# Patient Record
Sex: FEMALE | Race: WHITE | NOT HISPANIC OR LATINO
[De-identification: names, ages, dates, MRNs, and addresses within clinical notes are randomized per-mention and may not be internally consistent; named-entity substitution may affect disease eponyms.]

---

## 2021-07-30 ENCOUNTER — APPOINTMENT (OUTPATIENT)
Dept: DISASTER EMERGENCY | Facility: CLINIC | Age: 66
End: 2021-07-30

## 2021-07-30 DIAGNOSIS — Z01.818 ENCOUNTER FOR OTHER PREPROCEDURAL EXAMINATION: ICD-10-CM

## 2021-07-30 PROBLEM — Z00.00 ENCOUNTER FOR PREVENTIVE HEALTH EXAMINATION: Status: ACTIVE | Noted: 2021-07-30

## 2021-09-10 ENCOUNTER — EMERGENCY (EMERGENCY)
Facility: HOSPITAL | Age: 66
LOS: 1 days | End: 2021-09-10
Admitting: EMERGENCY MEDICINE
Payer: OTHER MISCELLANEOUS

## 2021-09-10 PROCEDURE — 93970 EXTREMITY STUDY: CPT | Mod: 26

## 2021-09-10 PROCEDURE — 99285 EMERGENCY DEPT VISIT HI MDM: CPT

## 2022-08-03 ENCOUNTER — APPOINTMENT (OUTPATIENT)
Dept: ORTHOPEDIC SURGERY | Facility: CLINIC | Age: 67
End: 2022-08-03

## 2022-08-03 VITALS — WEIGHT: 143 LBS | HEIGHT: 66 IN | BODY MASS INDEX: 22.98 KG/M2

## 2022-08-03 PROCEDURE — 72110 X-RAY EXAM L-2 SPINE 4/>VWS: CPT

## 2022-08-03 PROCEDURE — 99214 OFFICE O/P EST MOD 30 MIN: CPT

## 2022-08-03 PROCEDURE — 99072 ADDL SUPL MATRL&STAF TM PHE: CPT

## 2022-08-03 PROCEDURE — 72170 X-RAY EXAM OF PELVIS: CPT

## 2022-08-03 RX ORDER — GABAPENTIN 600 MG/1
600 TABLET, COATED ORAL
Qty: 90 | Refills: 0 | Status: ACTIVE | COMMUNITY
Start: 2022-06-10

## 2022-08-03 RX ORDER — FLUTICASONE PROPIONATE 50 UG/1
50 SPRAY, METERED NASAL
Qty: 48 | Refills: 0 | Status: ACTIVE | COMMUNITY
Start: 2022-02-13

## 2022-08-03 RX ORDER — DIAZEPAM 10 MG/1
10 TABLET ORAL
Qty: 14 | Refills: 0 | Status: ACTIVE | COMMUNITY
Start: 2022-07-07

## 2022-08-03 RX ORDER — DULOXETINE HYDROCHLORIDE 60 MG/1
60 CAPSULE, DELAYED RELEASE PELLETS ORAL
Qty: 30 | Refills: 0 | Status: ACTIVE | COMMUNITY
Start: 2022-07-07

## 2022-08-03 RX ORDER — LANSOPRAZOLE 30 MG/1
30 CAPSULE, DELAYED RELEASE ORAL
Qty: 30 | Refills: 0 | Status: ACTIVE | COMMUNITY
Start: 2022-07-28

## 2022-08-03 RX ORDER — CYCLOBENZAPRINE HYDROCHLORIDE 10 MG/1
10 TABLET, FILM COATED ORAL
Qty: 90 | Refills: 0 | Status: ACTIVE | COMMUNITY
Start: 2022-06-27

## 2022-08-12 NOTE — ASSESSMENT
[FreeTextEntry1] : 66 y/o female 1 year S/p T10-S1 PSF. I advised the patient to follow up with her pain management doctor to discuss repeat Injections. I discussed with the patient that if she is seeing relief with SIJ steroid injections, then she may be a potential surgical candidate for sacroiliac fusion. I am requesting a bilateral lower extremity EMG/NCV to evaluate for chronic radiculopathy vs peroneal neuropathy. I would like to follow up with the patient in 6-8 weeks to assess the results of her EMG. \par \par Prior to appointment and during encounter with patient extensive medical records were reviewed including but not limited to, hospital records, out patient records, imaging results, and lab data. During this appointment the patient was examined, diagnoses were discussed and explained in a face to face manner. In addition extensive time was spent reviewing aforementioned diagnostic studies. Counseling including abnormal image results, differential diagnoses, treatment options, risk and benefits, lifestyle changes, current condition, and current medications was performed. Patient's comments, questions, and concerns were address and patient verbalized understanding. Based on this patient's presentation at our office, which is an orthopedic spine surgeon's office, this patient inherently / intrinsically has a risk, however minute, of developing issues such as Cauda equina syndrome, bowel and bladder changes, or progression of motor or neurological deficits such as paralysis which may be permanent.\par \par I, Ernesto Linder, attest that this documentation has been prepared under the direction and in the presence of provider Paul Ritchie MD.

## 2022-08-12 NOTE — PHYSICAL EXAM
[NL (90)] : forward flexion 90 degrees [NL (30)] : right lateral rotation 30 degrees [NL (45)] : extension 45 degrees [NL (40)] : right lateral bending 40 degrees [5___] : right extensor hallicus longus 5[unfilled]/5 [AP] : anteroposterior [Lateral] : lateral [Outside films reviewed] : Outside films reviewed [de-identified] : Constitutional:\par -  General Appearance: \par Unremarkable\par Body Habitus\par Well Developed \par Well Nourished\par Body Habitus\par No Deformities\par Well Groomed\par \par Ability To communicate:\par Normal\par \par Neurologic: \par Global sensation is intact to upper and lower extremities.  See examination of Neck and/or Spine for exceptions.\par Orientation to Time, Place and Person is: Normal\par Mood And Affect is Normal\par \par Skin:\par -  Head/Face, Right Upper/Lower Extremity, Left Upper/Lower Extremity: Normal\par See Examination of Neck and/or Spine for exceptions\par \par Cardiovascular:\par Peripheral Cardiovascular System is Normal\par \par Palpation of Lymph Nodes:\par Normal Palpation of lymph nodes in: Axilla, Cervical, Inguinal\par Abnormal Palpation of lymph nodes in: None  [] : patient ambulates with assistive device [de-identified] : Chronic L4 and L5 paresthesias on the left  [FreeTextEntry1] : Lumbar Spine X-Rays Taken (08/03/22) - IN-HOUSE [facility]\par On my interpretation of the images\par 1) T10-Sacrum PSF all hardware well placed\par 2) good signs of healing over the TP\par 3) No signs of loosening or failure  [FreeTextEntry9] : Mild to moderate Hip OA [FreeTextEntry8] : +tinels at the fibula head
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
## 2022-08-17 ENCOUNTER — APPOINTMENT (OUTPATIENT)
Dept: ORTHOPEDIC SURGERY | Facility: CLINIC | Age: 67
End: 2022-08-17

## 2022-08-29 ENCOUNTER — APPOINTMENT (OUTPATIENT)
Dept: NEUROLOGY | Facility: CLINIC | Age: 67
End: 2022-08-29

## 2022-10-05 ENCOUNTER — APPOINTMENT (OUTPATIENT)
Dept: ORTHOPEDIC SURGERY | Facility: CLINIC | Age: 67
End: 2022-10-05

## 2022-10-19 ENCOUNTER — APPOINTMENT (OUTPATIENT)
Dept: ORTHOPEDIC SURGERY | Facility: CLINIC | Age: 67
End: 2022-10-19

## 2023-03-29 ENCOUNTER — APPOINTMENT (OUTPATIENT)
Dept: ORTHOPEDIC SURGERY | Facility: CLINIC | Age: 68
End: 2023-03-29
Payer: OTHER MISCELLANEOUS

## 2023-03-29 PROCEDURE — 72070 X-RAY EXAM THORAC SPINE 2VWS: CPT

## 2023-03-29 PROCEDURE — 99214 OFFICE O/P EST MOD 30 MIN: CPT

## 2023-03-29 PROCEDURE — 72100 X-RAY EXAM L-S SPINE 2/3 VWS: CPT

## 2023-03-30 NOTE — PHYSICAL EXAM
[NL (90)] : forward flexion 90 degrees [NL (30)] : right lateral rotation 30 degrees [NL (45)] : extension 45 degrees [NL (40)] : right lateral bending 40 degrees [5___] : right extensor hallicus longus 5[unfilled]/5 [AP] : anteroposterior [Lateral] : lateral [Outside films reviewed] : Outside films reviewed [FreeTextEntry8] : +tinels at the fibula head  [Bilateral] : hip bilaterally [de-identified] : Constitutional:\par -  General Appearance: \par Unremarkable\par Body Habitus\par Well Developed \par Well Nourished\par Body Habitus\par No Deformities\par Well Groomed\par \par Ability To communicate:\par Normal\par \par Neurologic: \par Global sensation is intact to upper and lower extremities.  See examination of Neck and/or Spine for exceptions.\par Orientation to Time, Place and Person is: Normal\par Mood And Affect is Normal\par \par Skin:\par -  Head/Face, Right Upper/Lower Extremity, Left Upper/Lower Extremity: Normal\par See Examination of Neck and/or Spine for exceptions\par \par Cardiovascular:\par Peripheral Cardiovascular System is Normal\par \par Palpation of Lymph Nodes:\par Normal Palpation of lymph nodes in: Axilla, Cervical, Inguinal\par Abnormal Palpation of lymph nodes in: None  [] : patient ambulates with assistive device [de-identified] : stands with a pitched forward posture [de-identified] : pain in the hips b/l with ROM  [FreeTextEntry1] : Lumbar Spine X-Rays Taken (03/29/2023) - [facility]HOUSE [facility]\par On my interpretation of the images\par 1) T10-Sacrum PSF all hardware well placed\par 2) good signs of healing over the TP\par 3) No signs of loosening or failure  [FreeTextEntry9] : Mild to moderate Hip OA
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
## 2023-04-10 ENCOUNTER — APPOINTMENT (OUTPATIENT)
Dept: NEUROLOGY | Facility: CLINIC | Age: 68
End: 2023-04-10
Payer: OTHER MISCELLANEOUS

## 2023-04-10 PROCEDURE — 95886 MUSC TEST DONE W/N TEST COMP: CPT | Mod: 50

## 2023-04-10 PROCEDURE — 95912 NRV CNDJ TEST 11-12 STUDIES: CPT

## 2023-05-31 ENCOUNTER — APPOINTMENT (OUTPATIENT)
Dept: ORTHOPEDIC SURGERY | Facility: CLINIC | Age: 68
End: 2023-05-31
Payer: OTHER MISCELLANEOUS

## 2023-05-31 VITALS — HEIGHT: 66 IN | WEIGHT: 143 LBS | BODY MASS INDEX: 22.98 KG/M2

## 2023-05-31 PROCEDURE — 99214 OFFICE O/P EST MOD 30 MIN: CPT

## 2023-05-31 RX ORDER — HYDROCODONE BITARTRATE AND ACETAMINOPHEN 7.5; 3 MG/1; MG/1
7.5-3 TABLET ORAL
Qty: 120 | Refills: 0 | Status: DISCONTINUED | COMMUNITY
Start: 2022-07-07 | End: 2023-05-31

## 2023-05-31 RX ORDER — HYDROCODONE BITARTRATE AND ACETAMINOPHEN 10; 325 MG/1; MG/1
10-325 TABLET ORAL
Qty: 96 | Refills: 0 | Status: DISCONTINUED | COMMUNITY
Start: 2022-02-14 | End: 2023-05-31
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
## 2023-05-31 NOTE — PHYSICAL EXAM
[NL (90)] : forward flexion 90 degrees [NL (30)] : right lateral rotation 30 degrees [NL (45)] : extension 45 degrees [NL (40)] : right lateral bending 40 degrees [5___] : right extensor hallicus longus 5[unfilled]/5 [Bilateral] : hip bilaterally [AP] : anteroposterior [Lateral] : lateral [Outside films reviewed] : Outside films reviewed [de-identified] : Constitutional:\par -  General Appearance: \par Unremarkable\par Body Habitus\par Well Developed \par Well Nourished\par Body Habitus\par No Deformities\par Well Groomed\par \par Ability To communicate:\par Normal\par \par Neurologic: \par Global sensation is intact to upper and lower extremities.  See examination of Neck and/or Spine for exceptions.\par Orientation to Time, Place and Person is: Normal\par Mood And Affect is Normal\par \par Skin:\par -  Head/Face, Right Upper/Lower Extremity, Left Upper/Lower Extremity: Normal\par See Examination of Neck and/or Spine for exceptions\par \par Cardiovascular:\par Peripheral Cardiovascular System is Normal\par \par Palpation of Lymph Nodes:\par Normal Palpation of lymph nodes in: Axilla, Cervical, Inguinal\par Abnormal Palpation of lymph nodes in: None  [] : patient ambulates with assistive device [de-identified] : stands with a pitched forward posture [de-identified] : pain in the hips b/l with ROM  [de-identified] : L4-5 paraesthesias in L [FreeTextEntry1] : Lumbar Spine X-Rays Taken (03/29/2023) - [facility]HOUSE [facility]\par On my interpretation of the images\par 1) T10-Sacrum PSF all hardware well placed\par 2) good signs of healing over the TP\par 3) No signs of loosening or failure  [FreeTextEntry9] : Mild to moderate Hip OA

## 2023-05-31 NOTE — DATA REVIEWED
[FreeTextEntry1] : On my interpretation of these images from [facility] on 04/10/2023: Lumbar EMG\par L3-S1 chronic not acute radiculopathy L>R.

## 2023-07-10 ENCOUNTER — APPOINTMENT (OUTPATIENT)
Dept: PAIN MANAGEMENT | Facility: CLINIC | Age: 68
End: 2023-07-10

## 2023-08-02 ENCOUNTER — APPOINTMENT (OUTPATIENT)
Dept: ORTHOPEDIC SURGERY | Facility: CLINIC | Age: 68
End: 2023-08-02
Payer: OTHER MISCELLANEOUS

## 2023-08-02 VITALS — BODY MASS INDEX: 23.3 KG/M2 | WEIGHT: 145 LBS | HEIGHT: 66 IN

## 2023-08-02 PROCEDURE — 99214 OFFICE O/P EST MOD 30 MIN: CPT

## 2023-08-02 NOTE — PHYSICAL EXAM
[NL (90)] : forward flexion 90 degrees [NL (30)] : right lateral rotation 30 degrees [NL (45)] : extension 45 degrees [NL (40)] : right lateral bending 40 degrees [5___] : right extensor hallicus longus 5[unfilled]/5 [Bilateral] : hip bilaterally [AP] : anteroposterior [Lateral] : lateral [Outside films reviewed] : Outside films reviewed [de-identified] : Constitutional:\par  -  General Appearance: \par  Unremarkable\par  Body Habitus\par  Well Developed \par  Well Nourished\par  Body Habitus\par  No Deformities\par  Well Groomed\par  \par  Ability To communicate:\par  Normal\par  \par  Neurologic: \par  Global sensation is intact to upper and lower extremities.  See examination of Neck and/or Spine for exceptions.\par  Orientation to Time, Place and Person is: Normal\par  Mood And Affect is Normal\par  \par  Skin:\par  -  Head/Face, Right Upper/Lower Extremity, Left Upper/Lower Extremity: Normal\par  See Examination of Neck and/or Spine for exceptions\par  \par  Cardiovascular:\par  Peripheral Cardiovascular System is Normal\par  \par  Palpation of Lymph Nodes:\par  Normal Palpation of lymph nodes in: Axilla, Cervical, Inguinal\par  Abnormal Palpation of lymph nodes in: None  [] : non-antalgic [de-identified] : stands with a pitched forward posture [FreeTextEntry8] : L>R sciatic nerve.  [de-identified] : pain in the hips b/l with ROM  [FreeTextEntry1] : Lumbar Spine X-Rays Taken (03/29/2023) - [facility]HOU[facility]\par  On my interpretation of the images\par  1) T10-Sacrum PSF all hardware well placed\par  2) good signs of healing over the TP\par  3) No signs of loosening or failure  [FreeTextEntry9] : Mild to moderate Hip OA

## 2023-08-02 NOTE — ASSESSMENT
[FreeTextEntry1] : 67 y/o female with L3-S1 chronic radiculopathy L>R and prior T10-S1 PSF DOS:08/02/2021. She has significant persistent back pain as well as L>R radicular pain.  Continued sacroiliitis.  Advised patient to follow up with pain management for left SIJ injection and monitor relief. Patient is not interested in any surgical intervention in the foreseeable future. I will see her back in 2 months, after the suggested sacroiliac injection.  At that time it would be reasonable to discuss MMI.   Prior to appointment and during encounter with patient extensive medical records were reviewed including but not limited to, hospital records, out patient records, imaging results, and lab data. During this appointment the patient was examined, diagnoses were discussed and explained in a face to face manner. In addition extensive time was spent reviewing aforementioned diagnostic studies. Counseling including abnormal image results, differential diagnoses, treatment options, risk and benefits, lifestyle changes, current condition, and current medications was performed. Patient's comments, questions, and concerns were address and patient verbalized understanding. Based on this patient's presentation at our office, which is an orthopedic spine surgeon's office, this patient inherently / intrinsically has a risk, however minute, of developing issues such as Cauda equina syndrome, bowel and bladder changes, or progression of motor or neurological deficits such as paralysis which may be permanent.  I, Suri Dior, attest that this documentation has been prepared under the direction and in the presence of provider Paul Ritchie MD.
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
## 2023-08-25 ENCOUNTER — APPOINTMENT (OUTPATIENT)
Dept: ORTHOPEDIC SURGERY | Facility: CLINIC | Age: 68
End: 2023-08-25
Payer: OTHER MISCELLANEOUS

## 2023-08-25 VITALS — HEIGHT: 66 IN | BODY MASS INDEX: 23.3 KG/M2 | WEIGHT: 145 LBS

## 2023-08-25 DIAGNOSIS — Z78.9 OTHER SPECIFIED HEALTH STATUS: ICD-10-CM

## 2023-08-25 PROCEDURE — 99455 WORK RELATED DISABILITY EXAM: CPT

## 2023-08-25 NOTE — PHYSICAL EXAM
[NL (90)] : forward flexion 90 degrees [NL (30)] : right lateral rotation 30 degrees [NL (45)] : extension 45 degrees [NL (40)] : right lateral bending 40 degrees [5___] : right extensor hallicus longus 5[unfilled]/5 [AP] : anteroposterior [Lateral] : lateral [Outside films reviewed] : Outside films reviewed [4___] : left extensor hallicus longus 4[unfilled]/5 [de-identified] : Constitutional:\par  -  General Appearance: \par  Unremarkable\par  Body Habitus\par  Well Developed \par  Well Nourished\par  Body Habitus\par  No Deformities\par  Well Groomed\par  \par  Ability To communicate:\par  Normal\par  \par  Neurologic: \par  Global sensation is intact to upper and lower extremities.  See examination of Neck and/or Spine for exceptions.\par  Orientation to Time, Place and Person is: Normal\par  Mood And Affect is Normal\par  \par  Skin:\par  -  Head/Face, Right Upper/Lower Extremity, Left Upper/Lower Extremity: Normal\par  See Examination of Neck and/or Spine for exceptions\par  \par  Cardiovascular:\par  Peripheral Cardiovascular System is Normal\par  \par  Palpation of Lymph Nodes:\par  Normal Palpation of lymph nodes in: Axilla, Cervical, Inguinal\par  Abnormal Palpation of lymph nodes in: None  [] : non-antalgic [de-identified] : stands with a pitched forward posture [FreeTextEntry3] : well healed incision from mid thoracic to sacrum.  [FreeTextEntry8] : L>R sciatic nerve. L>R SIJ Paraspinal pain L4-S1.  cephalad portion of the incision.  [de-identified] : L5-S1 paraesthesias on the left. 1+ reflexes at the patella and Achilles.  [de-identified] : pain in the hips b/l with ROM  [FreeTextEntry1] : Lumbar Spine X-Rays Taken (03/29/2023) - IN-HOUSE [facility]\par  On my interpretation of the images\par  1) T10-Sacrum PSF all hardware well placed\par  2) good signs of healing over the TP\par  3) No signs of loosening or failure  [FreeTextEntry9] : Mild to moderate Hip OA
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
## 2023-08-25 NOTE — ASSESSMENT
[FreeTextEntry1] : 67 y/o female with L3-S1 chronic radiculopathy L>R and prior T10-S1 PSF DOS:08/02/2021. She has significant persistent back pain as well as L>R radicular pain.  Continued sacroiliitis. Continues to be at MMI. Moving forward I'd like to follow up on an as needed basis.   Prior to appointment and during encounter with patient extensive medical records were reviewed including but not limited to, hospital records, out patient records, imaging results, and lab data. During this appointment the patient was examined, diagnoses were discussed and explained in a face to face manner. In addition extensive time was spent reviewing aforementioned diagnostic studies. Counseling including abnormal image results, differential diagnoses, treatment options, risk and benefits, lifestyle changes, current condition, and current medications was performed. Patient's comments, questions, and concerns were address and patient verbalized understanding. Based on this patient's presentation at our office, which is an orthopedic spine surgeon's office, this patient inherently / intrinsically has a risk, however minute, of developing issues such as Cauda equina syndrome, bowel and bladder changes, or progression of motor or neurological deficits such as paralysis which may be permanent.  I, Suri Dior, attest that this documentation has been prepared under the direction and in the presence of provider Paul Ritchie MD.

## 2023-11-17 ENCOUNTER — APPOINTMENT (OUTPATIENT)
Dept: ORTHOPEDIC SURGERY | Facility: CLINIC | Age: 68
End: 2023-11-17
Payer: OTHER MISCELLANEOUS

## 2023-11-17 VITALS — HEIGHT: 66 IN | BODY MASS INDEX: 23.3 KG/M2 | WEIGHT: 145 LBS

## 2023-11-17 DIAGNOSIS — M54.17 RADICULOPATHY, LUMBOSACRAL REGION: ICD-10-CM

## 2023-11-17 PROCEDURE — 99213 OFFICE O/P EST LOW 20 MIN: CPT

## 2023-11-17 PROCEDURE — 72070 X-RAY EXAM THORAC SPINE 2VWS: CPT

## 2023-11-17 PROCEDURE — 72100 X-RAY EXAM L-S SPINE 2/3 VWS: CPT

## 2024-02-14 ENCOUNTER — APPOINTMENT (OUTPATIENT)
Dept: ORTHOPEDIC SURGERY | Facility: CLINIC | Age: 69
End: 2024-02-14

## 2024-02-28 ENCOUNTER — APPOINTMENT (OUTPATIENT)
Dept: ORTHOPEDIC SURGERY | Facility: CLINIC | Age: 69
End: 2024-02-28
Payer: OTHER MISCELLANEOUS

## 2024-02-28 VITALS — HEIGHT: 66 IN | BODY MASS INDEX: 23.3 KG/M2 | WEIGHT: 145 LBS

## 2024-02-28 DIAGNOSIS — Z98.1 ARTHRODESIS STATUS: ICD-10-CM

## 2024-02-28 DIAGNOSIS — G89.29 LOW BACK PAIN, UNSPECIFIED: ICD-10-CM

## 2024-02-28 DIAGNOSIS — M54.50 LOW BACK PAIN, UNSPECIFIED: ICD-10-CM

## 2024-02-28 DIAGNOSIS — J45.909 UNSPECIFIED ASTHMA, UNCOMPLICATED: ICD-10-CM

## 2024-02-28 DIAGNOSIS — M19.90 UNSPECIFIED OSTEOARTHRITIS, UNSPECIFIED SITE: ICD-10-CM

## 2024-02-28 DIAGNOSIS — M46.1 SACROILIITIS, NOT ELSEWHERE CLASSIFIED: ICD-10-CM

## 2024-02-28 PROCEDURE — 99214 OFFICE O/P EST MOD 30 MIN: CPT

## 2024-02-28 PROCEDURE — 72100 X-RAY EXAM L-S SPINE 2/3 VWS: CPT

## 2024-02-28 NOTE — PHYSICAL EXAM
[NL (90)] : forward flexion 90 degrees [NL (30)] : right lateral rotation 30 degrees [NL (45)] : extension 45 degrees [NL (40)] : left lateral bending 40 degrees [4___] : left extensor hallicus longus 4[unfilled]/5 [5___] : right extensor hallicus longus 5[unfilled]/5 [AP] : anteroposterior [Lateral] : lateral [Outside films reviewed] : Outside films reviewed [de-identified] : Constitutional:\par  -  General Appearance: \par  Unremarkable\par  Body Habitus\par  Well Developed \par  Well Nourished\par  Body Habitus\par  No Deformities\par  Well Groomed\par  \par  Ability To communicate:\par  Normal\par  \par  Neurologic: \par  Global sensation is intact to upper and lower extremities.  See examination of Neck and/or Spine for exceptions.\par  Orientation to Time, Place and Person is: Normal\par  Mood And Affect is Normal\par  \par  Skin:\par  -  Head/Face, Right Upper/Lower Extremity, Left Upper/Lower Extremity: Normal\par  See Examination of Neck and/or Spine for exceptions\par  \par  Cardiovascular:\par  Peripheral Cardiovascular System is Normal\par  \par  Palpation of Lymph Nodes:\par  Normal Palpation of lymph nodes in: Axilla, Cervical, Inguinal\par  Abnormal Palpation of lymph nodes in: None  [] : non-antalgic [de-identified] : stands with a pitched forward posture. walking with cane, seated in wheelchair b/c of long walk in office.  [FreeTextEntry8] : L>R sciatic nerve. L>R SIJ Paraspinal pain L4-S1.  cephalad portion of the incision.  [FreeTextEntry3] : well healed incision from mid thoracic to sacrum.  [de-identified] : pain in the hips b/l with ROM  [de-identified] : L5-S1 paraesthesias on the left. 1+ reflexes at the patella and Achilles.  [FreeTextEntry1] : Lumbar Spine X-Rays Taken (03/29/2023) - IN-HOUSE [facility]\par  On my interpretation of the images\par  1) T10-Sacrum PSF all hardware well placed\par  2) good signs of healing over the TP\par  3) No signs of loosening or failure  [FreeTextEntry9] : Mild to moderate Hip OA
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
## 2024-02-28 NOTE — DATA REVIEWED
[FreeTextEntry1] : On my interpretations of these images from [facility] in [place] on 02/28/2024: I have independently reviewed and interpreted these images and reports. 2 V lumbar X-ray in the office today shows no evidence of hardware loosening or failure. No changes in comparison to last office x-ray.   On my interpretations of these images from [facility] in [place] on 11/17/2023: I have independently reviewed and interpreted these images and reports. thoracic x-ray reveals no progressive degeneration, proximal to fusion, no loosening of hardware.   On my interpretations of these images from [facility] in [place] on 11/17/2023: I have independently reviewed and interpreted these images and reports. lumbar x-ray reveals, very well fused no signs of loosening or complications.

## 2024-02-28 NOTE — DISCUSSION/SUMMARY
[de-identified] : 70 y/o female with L3-S1 chronic radiculopathy L>R and prior T10-S1 PSF DOS:08/02/2021. Patient will continue with interventional pain management for SI joint injections. We briefly discussed possible SI joint ablation if she has diagnostic injections but is refractory to initial modality. She is hesitant to pursue anything more than steroid injection at this time. In regard to her disability status, she is significantly in her day-to-day functional capacity as a result of her multiple lumbar fusion. In our opinion, she is totally permanently disabled from her usual occupation. No additional surgery scheduled at this time. Patient will follow up with us on a PRN basis.    Prior to appointment and during encounter with patient extensive medical records were reviewed including but not limited to, hospital records, out patient records, imaging results, and lab data. During this appointment the patient was examined, diagnoses were discussed and explained in a face to face manner. In addition, extensive time was spent reviewing aforementioned diagnostic studies. Counseling including abnormal image results, differential diagnoses, treatment options, risk and benefits, lifestyle changes, current condition, and current medications was performed. Patient's comments, questions, and concerns were address and patient verbalized understanding. Based on this patient's presentation at our office, which is an orthopedic spine surgeon's office, this patient inherently / intrinsically has a risk, however minute, of developing issues such as Cauda equina syndrome, bowel and bladder changes, or progression of motor or neurological deficits such as paralysis which may be permanent.
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
## 2024-09-18 ENCOUNTER — APPOINTMENT (OUTPATIENT)
Dept: ORTHOPEDIC SURGERY | Facility: CLINIC | Age: 69
End: 2024-09-18
Payer: OTHER MISCELLANEOUS

## 2024-09-18 DIAGNOSIS — Z98.1 ARTHRODESIS STATUS: ICD-10-CM

## 2024-09-18 DIAGNOSIS — M54.50 LOW BACK PAIN, UNSPECIFIED: ICD-10-CM

## 2024-09-18 DIAGNOSIS — G89.29 LOW BACK PAIN, UNSPECIFIED: ICD-10-CM

## 2024-09-18 DIAGNOSIS — M46.1 SACROILIITIS, NOT ELSEWHERE CLASSIFIED: ICD-10-CM

## 2024-09-18 PROCEDURE — 99214 OFFICE O/P EST MOD 30 MIN: CPT

## 2024-09-18 PROCEDURE — 72100 X-RAY EXAM L-S SPINE 2/3 VWS: CPT

## 2024-09-18 PROCEDURE — 72070 X-RAY EXAM THORAC SPINE 2VWS: CPT

## 2024-09-18 PROCEDURE — 72170 X-RAY EXAM OF PELVIS: CPT
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
## 2024-09-21 NOTE — DATA REVIEWED
[FreeTextEntry1] : On my interpretations of these images from [facility] on 09/18/2024: I have independently reviewed and interpreted these images. pelvis XR- mild DDD at BL SIJ. Mod BL hip degeneration w/ loss of medial joint space.   On my interpretations of these images from [facility] in [place] on 09/18/2024: I have independently reviewed and interpreted these images. 2 v lumbar XR- Full lumbar fusion from T12 on XR w no signs of hardware failure, robust fusion over posterior lateral.   On my interpretations of these images from [facility] in [place] on 09/18/2024: I have independently reviewed and interpreted these images. 2 v thoracic XR- fusion instrumentation [id number] spine. no loosening of hardware, thoracic mod DDD from T7-T10. No focal kyphosis.   On my interpretations of these images from [facility] on 02/28/2024: I have independently reviewed and interpreted these images and reports. 2 V lumbar X-ray in the office today shows no evidence of hardware loosening or failure. No changes in comparison to last office x-ray.   On my interpretations of these images from [facility] on 11/17/2023: I have independently reviewed and interpreted these images and reports. thoracic x-ray reveals no progressive degeneration, proximal to fusion, no loosening of hardware.   On my interpretations of these images from [facility] on 11/17/2023: I have independently reviewed and interpreted these images and reports. lumbar x-ray reveals, very well fused no signs of loosening or complications.

## 2024-09-21 NOTE — PHYSICAL EXAM
[NL (90)] : forward flexion 90 degrees [NL (30)] : right lateral rotation 30 degrees [NL (45)] : extension 45 degrees [NL (40)] : right lateral bending 40 degrees [4___] : left extensor hallicus longus 4[unfilled]/5 [5___] : right extensor hallicus longus 5[unfilled]/5 [Outside films reviewed] : Outside films reviewed [de-identified] : Constitutional:\par  -  General Appearance: \par  Unremarkable\par  Body Habitus\par  Well Developed \par  Well Nourished\par  Body Habitus\par  No Deformities\par  Well Groomed\par  \par  Ability To communicate:\par  Normal\par  \par  Neurologic: \par  Global sensation is intact to upper and lower extremities.  See examination of Neck and/or Spine for exceptions.\par  Orientation to Time, Place and Person is: Normal\par  Mood And Affect is Normal\par  \par  Skin:\par  -  Head/Face, Right Upper/Lower Extremity, Left Upper/Lower Extremity: Normal\par  See Examination of Neck and/or Spine for exceptions\par  \par  Cardiovascular:\par  Peripheral Cardiovascular System is Normal\par  \par  Palpation of Lymph Nodes:\par  Normal Palpation of lymph nodes in: Axilla, Cervical, Inguinal\par  Abnormal Palpation of lymph nodes in: None  [] : clonus not sustained at ankle [FreeTextEntry3] : well healed incision from mid thoracic to sacrum.  [de-identified] : stands with a pitched forward posture. walking with cane, seated in wheelchair b/c of long walk in office.  [FreeTextEntry8] : pain RT on top of incision.  [de-identified] : L>R groin pain with deep hip flexion.  [de-identified] : chronic radic pain in BL calves.  [FreeTextEntry1] : Lumbar Spine X-Rays Taken (03/29/2023) - IN-HOUSE [facility]\par  On my interpretation of the images\par  1) T10-Sacrum PSF all hardware well placed\par  2) good signs of healing over the TP\par  3) No signs of loosening or failure  [FreeTextEntry9] : patella femoral mild tracking BL, causing pain likely consistent w knee arthritis.

## 2024-09-21 NOTE — PHYSICAL EXAM
[NL (90)] : forward flexion 90 degrees [NL (30)] : right lateral rotation 30 degrees [NL (45)] : extension 45 degrees [NL (40)] : right lateral bending 40 degrees [4___] : left extensor hallicus longus 4[unfilled]/5 [5___] : right extensor hallicus longus 5[unfilled]/5 [Outside films reviewed] : Outside films reviewed [de-identified] : Constitutional:\par  -  General Appearance: \par  Unremarkable\par  Body Habitus\par  Well Developed \par  Well Nourished\par  Body Habitus\par  No Deformities\par  Well Groomed\par  \par  Ability To communicate:\par  Normal\par  \par  Neurologic: \par  Global sensation is intact to upper and lower extremities.  See examination of Neck and/or Spine for exceptions.\par  Orientation to Time, Place and Person is: Normal\par  Mood And Affect is Normal\par  \par  Skin:\par  -  Head/Face, Right Upper/Lower Extremity, Left Upper/Lower Extremity: Normal\par  See Examination of Neck and/or Spine for exceptions\par  \par  Cardiovascular:\par  Peripheral Cardiovascular System is Normal\par  \par  Palpation of Lymph Nodes:\par  Normal Palpation of lymph nodes in: Axilla, Cervical, Inguinal\par  Abnormal Palpation of lymph nodes in: None  [] : non-antalgic [FreeTextEntry3] : well healed incision from mid thoracic to sacrum.  [de-identified] : stands with a pitched forward posture. walking with cane, seated in wheelchair b/c of long walk in office.  [FreeTextEntry8] : pain RT on top of incision.  [de-identified] : L>R groin pain with deep hip flexion.  [de-identified] : chronic radic pain in BL calves.  [FreeTextEntry1] : Lumbar Spine X-Rays Taken (03/29/2023) - IN-HOUSE [facility]\par  On my interpretation of the images\par  1) T10-Sacrum PSF all hardware well placed\par  2) good signs of healing over the TP\par  3) No signs of loosening or failure  [FreeTextEntry9] : patella femoral mild tracking BL, causing pain likely consistent w knee arthritis.

## 2024-09-21 NOTE — DISCUSSION/SUMMARY
[de-identified] : 70 y/o female with L3-S1 chronic radiculopathy L>R and prior T10-S1 PSF DOS:08/02/2021. Patient will continue with interventional pain management for SI joint injections. We briefly discussed possible SI joint ablation if she has diagnostic injections but is refractory to initial modality.  Pt was advised that there is no need for advanced imaging as based upon recent unchanged XRs.  Pt cannot return to sedentary work as pt is on chronic narcotic pain medications. Patient remains 100% total disability from customary work at this time due to symptom severity. Patient will stay OOW. Note given.  In our opinion, she is totally permanently disabled from her usual occupation. No additional surgery scheduled at this time. Patient will follow up with us on a PRN basis.  Referring pt to Dr. Cheng or Dr. Armas to eval knee and hip pains.   Prior to appointment and during encounter with patient extensive medical records were reviewed including but not limited to, hospital records, out patient records, imaging results, and lab data. During this appointment the patient was examined, diagnoses were discussed and explained in a face to face manner. In addition, extensive time was spent reviewing aforementioned diagnostic studies. Counseling including abnormal image results, differential diagnoses, treatment options, risk and benefits, lifestyle changes, current condition, and current medications was performed. Patient's comments, questions, and concerns were address and patient verbalized understanding. Based on this patient's presentation at our office, which is an orthopedic spine surgeon's office, this patient inherently / intrinsically has a risk, however minute, of developing issues such as Cauda equina syndrome, bowel and bladder changes, or progression of motor or neurological deficits such as paralysis which may be permanent.   I, Suri Dior, attest that this documentation has been prepared under the direction and in the presence of provider Paul Ritchie MD.
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
## 2024-09-21 NOTE — DISCUSSION/SUMMARY
[de-identified] : 70 y/o female with L3-S1 chronic radiculopathy L>R and prior T10-S1 PSF DOS:08/02/2021. Patient will continue with interventional pain management for SI joint injections. We briefly discussed possible SI joint ablation if she has diagnostic injections but is refractory to initial modality.  Pt was advised that there is no need for advanced imaging as based upon recent unchanged XRs.  Pt cannot return to sedentary work as pt is on chronic narcotic pain medications. Patient remains 100% total disability from customary work at this time due to symptom severity. Patient will stay OOW. Note given.  In our opinion, she is totally permanently disabled from her usual occupation. No additional surgery scheduled at this time. Patient will follow up with us on a PRN basis.  Referring pt to Dr. Cheng or Dr. Armas to eval knee and hip pains.   Prior to appointment and during encounter with patient extensive medical records were reviewed including but not limited to, hospital records, out patient records, imaging results, and lab data. During this appointment the patient was examined, diagnoses were discussed and explained in a face to face manner. In addition, extensive time was spent reviewing aforementioned diagnostic studies. Counseling including abnormal image results, differential diagnoses, treatment options, risk and benefits, lifestyle changes, current condition, and current medications was performed. Patient's comments, questions, and concerns were address and patient verbalized understanding. Based on this patient's presentation at our office, which is an orthopedic spine surgeon's office, this patient inherently / intrinsically has a risk, however minute, of developing issues such as Cauda equina syndrome, bowel and bladder changes, or progression of motor or neurological deficits such as paralysis which may be permanent.   I, Suri Dior, attest that this documentation has been prepared under the direction and in the presence of provider Paul Ritchie MD.
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
## 2024-09-21 NOTE — DATA REVIEWED
[FreeTextEntry1] : On my interpretations of these images from [facility] on 09/18/2024: I have independently reviewed and interpreted these images. pelvis XR- mild DDD at BL SIJ. Mod BL hip degeneration w/ loss of medial joint space.   On my interpretations of these images from [facility] in [place] on 09/18/2024: I have independently reviewed and interpreted these images. 2 v lumbar XR- Full lumbar fusion from T12 on XR w no signs of hardware failure, robust fusion over posterior lateral.   On my interpretations of these images from [facility] in [place] on 09/18/2024: I have independently reviewed and interpreted these images. 2 v thoracic XR- fusion instrumentation [id number] spine. no loosening of hardware, thoracic mod DDD from T7-T10. No focal kyphosis.   On my interpretations of these images from [facility] on 02/28/2024: I have independently reviewed and interpreted these images and reports. 2 V lumbar X-ray in the office today shows no evidence of hardware loosening or failure. No changes in comparison to last office x-ray.   On my interpretations of these images from [facility] on 11/17/2023: I have independently reviewed and interpreted these images and reports. thoracic x-ray reveals no progressive degeneration, proximal to fusion, no loosening of hardware.   On my interpretations of these images from [facility] on 11/17/2023: I have independently reviewed and interpreted these images and reports. lumbar x-ray reveals, very well fused no signs of loosening or complications.

## 2024-10-30 ENCOUNTER — APPOINTMENT (OUTPATIENT)
Dept: ORTHOPEDIC SURGERY | Facility: CLINIC | Age: 69
End: 2024-10-30
Payer: OTHER MISCELLANEOUS

## 2024-10-30 VITALS — WEIGHT: 140 LBS | BODY MASS INDEX: 22.5 KG/M2 | HEIGHT: 66 IN

## 2024-10-30 DIAGNOSIS — M54.17 RADICULOPATHY, LUMBOSACRAL REGION: ICD-10-CM

## 2024-10-30 DIAGNOSIS — M46.1 SACROILIITIS, NOT ELSEWHERE CLASSIFIED: ICD-10-CM

## 2024-10-30 DIAGNOSIS — Z98.1 ARTHRODESIS STATUS: ICD-10-CM

## 2024-10-30 PROCEDURE — 99213 OFFICE O/P EST LOW 20 MIN: CPT
